# Patient Record
Sex: FEMALE | Race: BLACK OR AFRICAN AMERICAN | Employment: FULL TIME
[De-identification: names, ages, dates, MRNs, and addresses within clinical notes are randomized per-mention and may not be internally consistent; named-entity substitution may affect disease eponyms.]

---

## 2024-08-10 ENCOUNTER — APPOINTMENT (OUTPATIENT)
Facility: HOSPITAL | Age: 29
End: 2024-08-10
Payer: COMMERCIAL

## 2024-08-10 ENCOUNTER — HOSPITAL ENCOUNTER (EMERGENCY)
Facility: HOSPITAL | Age: 29
Discharge: HOME | End: 2024-08-10
Attending: EMERGENCY MEDICINE
Payer: COMMERCIAL

## 2024-08-10 VITALS
WEIGHT: 165.8 LBS | HEIGHT: 63 IN | DIASTOLIC BLOOD PRESSURE: 74 MMHG | TEMPERATURE: 97.9 F | SYSTOLIC BLOOD PRESSURE: 109 MMHG | OXYGEN SATURATION: 99 % | HEART RATE: 61 BPM | BODY MASS INDEX: 29.38 KG/M2 | RESPIRATION RATE: 19 BRPM

## 2024-08-10 DIAGNOSIS — R07.89 CHEST WALL PAIN: Primary | ICD-10-CM

## 2024-08-10 LAB
ANION GAP SERPL CALC-SCNC: 10 MMOL/L (ref 3–18)
BASOPHILS # BLD: 0.1 K/UL (ref 0–0.1)
BASOPHILS NFR BLD: 1 % (ref 0–2)
BUN SERPL-MCNC: 12 MG/DL (ref 7–18)
BUN/CREAT SERPL: 13 (ref 12–20)
CALCIUM SERPL-MCNC: 9 MG/DL (ref 8.5–10.1)
CHLORIDE SERPL-SCNC: 108 MMOL/L (ref 100–111)
CO2 SERPL-SCNC: 23 MMOL/L (ref 21–32)
CREAT SERPL-MCNC: 0.92 MG/DL (ref 0.6–1.3)
DIFFERENTIAL METHOD BLD: ABNORMAL
EKG ATRIAL RATE: 71 BPM
EKG DIAGNOSIS: NORMAL
EKG P AXIS: 33 DEGREES
EKG P-R INTERVAL: 140 MS
EKG Q-T INTERVAL: 416 MS
EKG QRS DURATION: 82 MS
EKG QTC CALCULATION (BAZETT): 452 MS
EKG R AXIS: 15 DEGREES
EKG T AXIS: 14 DEGREES
EKG VENTRICULAR RATE: 71 BPM
EOSINOPHIL # BLD: 0.1 K/UL (ref 0–0.4)
EOSINOPHIL NFR BLD: 1 % (ref 0–5)
ERYTHROCYTE [DISTWIDTH] IN BLOOD BY AUTOMATED COUNT: 13.2 % (ref 11.6–14.5)
GLUCOSE SERPL-MCNC: 104 MG/DL (ref 74–99)
HCT VFR BLD AUTO: 39 % (ref 35–45)
HGB BLD-MCNC: 13.2 G/DL (ref 12–16)
IMM GRANULOCYTES # BLD AUTO: 0 K/UL (ref 0–0.04)
IMM GRANULOCYTES NFR BLD AUTO: 1 % (ref 0–0.5)
LYMPHOCYTES # BLD: 1.8 K/UL (ref 0.9–3.6)
LYMPHOCYTES NFR BLD: 23 % (ref 21–52)
MCH RBC QN AUTO: 30.1 PG (ref 24–34)
MCHC RBC AUTO-ENTMCNC: 33.8 G/DL (ref 31–37)
MCV RBC AUTO: 88.8 FL (ref 78–100)
MONOCYTES # BLD: 0.5 K/UL (ref 0.05–1.2)
MONOCYTES NFR BLD: 7 % (ref 3–10)
NEUTS SEG # BLD: 5.1 K/UL (ref 1.8–8)
NEUTS SEG NFR BLD: 67 % (ref 40–73)
NRBC # BLD: 0 K/UL (ref 0–0.01)
NRBC BLD-RTO: 0 PER 100 WBC
PLATELET # BLD AUTO: 351 K/UL (ref 135–420)
PMV BLD AUTO: 9.2 FL (ref 9.2–11.8)
POTASSIUM SERPL-SCNC: 3.8 MMOL/L (ref 3.5–5.5)
RBC # BLD AUTO: 4.39 M/UL (ref 4.2–5.3)
SODIUM SERPL-SCNC: 141 MMOL/L (ref 136–145)
TROPONIN I SERPL HS-MCNC: <3 NG/L (ref 0–54)
WBC # BLD AUTO: 7.5 K/UL (ref 4.6–13.2)

## 2024-08-10 PROCEDURE — 71045 X-RAY EXAM CHEST 1 VIEW: CPT

## 2024-08-10 PROCEDURE — 93010 ELECTROCARDIOGRAM REPORT: CPT | Performed by: INTERNAL MEDICINE

## 2024-08-10 PROCEDURE — 80048 BASIC METABOLIC PNL TOTAL CA: CPT

## 2024-08-10 PROCEDURE — 85025 COMPLETE CBC W/AUTO DIFF WBC: CPT

## 2024-08-10 PROCEDURE — 93005 ELECTROCARDIOGRAM TRACING: CPT | Performed by: EMERGENCY MEDICINE

## 2024-08-10 PROCEDURE — 96374 THER/PROPH/DIAG INJ IV PUSH: CPT

## 2024-08-10 PROCEDURE — 99285 EMERGENCY DEPT VISIT HI MDM: CPT

## 2024-08-10 PROCEDURE — 6360000002 HC RX W HCPCS: Performed by: EMERGENCY MEDICINE

## 2024-08-10 PROCEDURE — 84484 ASSAY OF TROPONIN QUANT: CPT

## 2024-08-10 RX ORDER — IBUPROFEN 600 MG/1
600 TABLET ORAL 3 TIMES DAILY PRN
Qty: 30 TABLET | Refills: 0 | Status: SHIPPED | OUTPATIENT
Start: 2024-08-10 | End: 2024-08-10

## 2024-08-10 RX ORDER — KETOROLAC TROMETHAMINE 15 MG/ML
15 INJECTION, SOLUTION INTRAMUSCULAR; INTRAVENOUS EVERY 6 HOURS PRN
Status: DISCONTINUED | OUTPATIENT
Start: 2024-08-10 | End: 2024-08-10 | Stop reason: HOSPADM

## 2024-08-10 RX ORDER — IBUPROFEN 600 MG/1
600 TABLET ORAL 3 TIMES DAILY PRN
Qty: 30 TABLET | Refills: 0 | Status: SHIPPED | OUTPATIENT
Start: 2024-08-10

## 2024-08-10 RX ADMIN — KETOROLAC TROMETHAMINE 15 MG: 15 INJECTION, SOLUTION INTRAMUSCULAR; INTRAVENOUS at 06:04

## 2024-08-10 ASSESSMENT — LIFESTYLE VARIABLES
HOW OFTEN DO YOU HAVE A DRINK CONTAINING ALCOHOL: NEVER
HOW MANY STANDARD DRINKS CONTAINING ALCOHOL DO YOU HAVE ON A TYPICAL DAY: PATIENT DOES NOT DRINK

## 2024-08-10 ASSESSMENT — PAIN DESCRIPTION - ORIENTATION
ORIENTATION: MID
ORIENTATION: MID

## 2024-08-10 ASSESSMENT — ENCOUNTER SYMPTOMS
SHORTNESS OF BREATH: 0
BACK PAIN: 0
WHEEZING: 0

## 2024-08-10 ASSESSMENT — PAIN SCALES - GENERAL
PAINLEVEL_OUTOF10: 7
PAINLEVEL_OUTOF10: 2
PAINLEVEL_OUTOF10: 8

## 2024-08-10 ASSESSMENT — PAIN DESCRIPTION - LOCATION
LOCATION: CHEST

## 2024-08-10 ASSESSMENT — PAIN - FUNCTIONAL ASSESSMENT: PAIN_FUNCTIONAL_ASSESSMENT: 0-10

## 2024-08-10 NOTE — ED PROVIDER NOTES
[facility] EMERGENCY DEPT  eMERGENCY dEPARTMENT eNCOUnter      Pt Name: Meche Cullen  MRN: [id number]  Birthdate 1995 of evaluation: 8/10/2024  Provider:Cristóbal Shepherd MD    CHIEF COMPLAINT       HPI    Meche Cullen is a 28 y.o. female who  awakened from sleep with anterior pleuritic chest pain. Her chest pain is worse with deep breathing. No hx of heart or lung diesease.    ROS  Review of Systems   Constitutional: Negative.    HENT: Negative.     Respiratory:  Negative for shortness of breath and wheezing.    Endocrine: Negative for polydipsia and polyuria.   Musculoskeletal:  Negative for back pain.   All other systems reviewed and are negative.      Except as noted above the remainder of the review of systems was reviewed and negative.       PAST MEDICAL HISTORY   History reviewed. No pertinent past medical history.      SURGICAL HISTORY       Past Surgical History:   Procedure Laterality Date     SECTION  2017         CURRENTMEDICATIONS       Previous Medications    DICYCLOMINE (BENTYL) 10 MG CAPSULE    Take 10 mg by mouth 3 times daily    ERGOCALCIFEROL (ERGOCALCIFEROL) 1.25 MG (99985 UT) CAPSULE    take 1 capsule by mouth every week    IBUPROFEN (ADVIL;MOTRIN) 600 MG TABLET    Take 600 mg by mouth every 6 hours as needed    ONDANSETRON (ZOFRAN-ODT) 4 MG DISINTEGRATING TABLET    Place 4 mg under the tongue every 8 hours as needed    PHENTERMINE (ADIPEX-P) 37.5 MG TABLET    Take 37.5 mg by mouth daily.       ALLERGIES     Patient has no known allergies.    FAMILY HISTORY       Family History   Problem Relation Age of Onset    Diabetes Maternal Grandmother           SOCIAL HISTORY       Social History     Socioeconomic History    Marital status: Legally      Spouse name: None    Number of children: None    Years of education: None    Highest education level: None   Tobacco Use    Smoking status: Never     Passive exposure: Never    Smokeless tobacco: Never   Substance and Sexual Activity    Weight: 75.2 kg (165 lb 12.8 oz)     Height: 1.6 m (5' 3\")         MDM    Differential: Muscle skeletal chest wall pain, pleuritis, pneumonia, cardiac problem    XR CHEST PORTABLE    (Results Pending)     CxR: negative    6:02 AM  Upon re-evaluation the patient's symptoms have improved. Pt has non-toxic appearance and condition is stable for discharge. Patient was informed of tests & results, instructed to f/u with PCP and return to the ED upon worsening of symptoms. All questions and concerns were addressed.     EKG: NSR, rate < 100, no ectopy    Procedures    FINAL IMPRESSION      Acute chest wall pain    DISPOSITION/PLAN       DISPOSITION    D/C home    DISCHARGE MEDICATIONS:   Motrin      PATIENT REFERRED TO:  PCP in 5 days.  Return to ER prn.    (Please note that portions of this note were completed with a voice recognitionprogram.  Efforts were made to edit the dictations but occasionally words are mis-transcribed.)    Cristóbal Shepherd MD(electronically signed)  Attending Emergency Physician          Cristóbal Shepherd MD  08/12/24 [id number]

## 2024-08-10 NOTE — ED NOTES
Pt ambulatory to room with steady gait, NAD and A&O x 4. Pt c/o midsternal pain and heaviness x 1 hour. States it woke her up and that the pain worsens when she takes a deep breath. MD aware of all. Pt is resting comfortably on stretcher on cardia monitors and call bell within reach.

## 2024-08-10 NOTE — ED TRIAGE NOTES
A&O female awoke from sleep with SOB and chest heaviness. Denies recent illness, denies cough, congestion n/n/d.

## 2024-08-16 ENCOUNTER — OFFICE VISIT (OUTPATIENT)
Facility: CLINIC | Age: 29
End: 2024-08-16
Payer: COMMERCIAL

## 2024-08-16 VITALS
BODY MASS INDEX: 28.31 KG/M2 | DIASTOLIC BLOOD PRESSURE: 79 MMHG | HEIGHT: 63 IN | TEMPERATURE: 97.6 F | HEART RATE: 97 BPM | OXYGEN SATURATION: 98 % | RESPIRATION RATE: 16 BRPM | WEIGHT: 159.8 LBS | SYSTOLIC BLOOD PRESSURE: 114 MMHG

## 2024-08-16 DIAGNOSIS — R07.89 COSTOCHONDRAL CHEST PAIN: ICD-10-CM

## 2024-08-16 DIAGNOSIS — Z09 HOSPITAL DISCHARGE FOLLOW-UP: Primary | ICD-10-CM

## 2024-08-16 PROCEDURE — 99213 OFFICE O/P EST LOW 20 MIN: CPT | Performed by: NURSE PRACTITIONER

## 2024-08-16 RX ORDER — TOPIRAMATE 50 MG/1
50 TABLET, FILM COATED ORAL 2 TIMES DAILY
COMMUNITY
Start: 2024-07-27

## 2024-08-16 SDOH — ECONOMIC STABILITY: FOOD INSECURITY: WITHIN THE PAST 12 MONTHS, THE FOOD YOU BOUGHT JUST DIDN'T LAST AND YOU DIDN'T HAVE MONEY TO GET MORE.: NEVER TRUE

## 2024-08-16 SDOH — ECONOMIC STABILITY: FOOD INSECURITY: WITHIN THE PAST 12 MONTHS, YOU WORRIED THAT YOUR FOOD WOULD RUN OUT BEFORE YOU GOT MONEY TO BUY MORE.: NEVER TRUE

## 2024-08-16 SDOH — ECONOMIC STABILITY: INCOME INSECURITY: HOW HARD IS IT FOR YOU TO PAY FOR THE VERY BASICS LIKE FOOD, HOUSING, MEDICAL CARE, AND HEATING?: NOT HARD AT ALL

## 2024-08-16 ASSESSMENT — ANXIETY QUESTIONNAIRES
7. FEELING AFRAID AS IF SOMETHING AWFUL MIGHT HAPPEN: NOT AT ALL
GAD7 TOTAL SCORE: 0
3. WORRYING TOO MUCH ABOUT DIFFERENT THINGS: NOT AT ALL
6. BECOMING EASILY ANNOYED OR IRRITABLE: NOT AT ALL
IF YOU CHECKED OFF ANY PROBLEMS ON THIS QUESTIONNAIRE, HOW DIFFICULT HAVE THESE PROBLEMS MADE IT FOR YOU TO DO YOUR WORK, TAKE CARE OF THINGS AT HOME, OR GET ALONG WITH OTHER PEOPLE: NOT DIFFICULT AT ALL
1. FEELING NERVOUS, ANXIOUS, OR ON EDGE: NOT AT ALL
2. NOT BEING ABLE TO STOP OR CONTROL WORRYING: NOT AT ALL
4. TROUBLE RELAXING: NOT AT ALL
5. BEING SO RESTLESS THAT IT IS HARD TO SIT STILL: NOT AT ALL

## 2024-08-16 ASSESSMENT — PATIENT HEALTH QUESTIONNAIRE - PHQ9
2. FEELING DOWN, DEPRESSED OR HOPELESS: NOT AT ALL
SUM OF ALL RESPONSES TO PHQ9 QUESTIONS 1 & 2: 0
SUM OF ALL RESPONSES TO PHQ QUESTIONS 1-9: 0
1. LITTLE INTEREST OR PLEASURE IN DOING THINGS: NOT AT ALL

## 2024-08-16 NOTE — PROGRESS NOTES
Meche Cullen (:  1995) is a 28 y.o. female, here for evaluation of the following medical concerns:  Chief Complaint   Patient presents with    ED Follow-up     8/10/2024- [facility] ER: Follow-up chest pain, unresolved now with sensitive to the touch          ASSESSMENT/PLAN:    1. Hospital discharge follow-up  2. Costochondral chest pain     Return if symptoms worsen or fail to improve.        HPI  She presented on 8/10/2024 to [facility] ER for sob and chest heaviness that woke her from sleep. DX with acute wall chest pain- MSK, ibuprofen  Xray Result (most recent):  XR CHEST PORTABLE 08/10/2024    Narrative  EXAM: XR CHEST PORTABLE    CLINICAL INDICATION/HISTORY: chest pain, SOB    TECHNIQUE: Portable AP view of the chest    COMPARISON: None    FINDINGS:      Faint opacities projecting of the right upper lung may be external to the  patient or could represent airspace disease. No dense consolidation. No large  pleural effusion. No pneumothorax. Normal cardiomediastinal silhouette.    Impression  Faint opacities projecting of the right upper lung may be external to the  patient or could represent airspace disease.      Electronically signed by Blair Lynch    Lab Results   Component Value Date    WBC 7.5 08/10/2024    HGB 13.2 08/10/2024    HCT 39.0 08/10/2024    MCV 88.8 08/10/2024     08/10/2024     Lab Results   Component Value Date     08/10/2024    K 3.8 08/10/2024     08/10/2024    CO2 23 08/10/2024    BUN 12 08/10/2024    CREATININE 0.92 08/10/2024    GLUCOSE 104 (H) 08/10/2024    CALCIUM 9.0 08/10/2024    BILITOT 0.8 2022    ALKPHOS 95 2022    AST 20 2022    ALT 27 2022    LABGLOM 86 08/10/2024    GFRAA >60 2022    AGRATIO 0.9 2022    GLOB 4.1 (H) 2022     EKG reported NSR    Continues to have a loose bra states the compression hurts the chest. Ibuprofen has helped with that pain.   Has been working out a lot weights and push ups so possible could

## 2024-08-16 NOTE — PROGRESS NOTES
\"Have you been to the ER, urgent care clinic since your last visit?  Hospitalized since your last visit?\"    8/10/2024- [facility] ER: Follow-up chest pain    “Have you seen or consulted any other health care providers outside of [facility] since your last visit?”    NO     “Have you had a pap smear?”    NO    Date of last Cervical Cancer screen (HPV or PAP): 1/18/2021     Click Here for Release of Records Request

## 2025-03-10 ENCOUNTER — APPOINTMENT (OUTPATIENT)
Facility: HOSPITAL | Age: 30
End: 2025-03-10
Payer: COMMERCIAL

## 2025-03-10 ENCOUNTER — HOSPITAL ENCOUNTER (EMERGENCY)
Facility: HOSPITAL | Age: 30
Discharge: HOME | End: 2025-03-10
Attending: EMERGENCY MEDICINE
Payer: COMMERCIAL

## 2025-03-10 VITALS
OXYGEN SATURATION: 100 % | HEART RATE: 85 BPM | SYSTOLIC BLOOD PRESSURE: 119 MMHG | HEIGHT: 63 IN | RESPIRATION RATE: 18 BRPM | DIASTOLIC BLOOD PRESSURE: 65 MMHG | BODY MASS INDEX: 31.01 KG/M2 | TEMPERATURE: 98 F | WEIGHT: 175 LBS

## 2025-03-10 DIAGNOSIS — K43.9 ABDOMINAL WALL HERNIA: Primary | ICD-10-CM

## 2025-03-10 LAB
ALBUMIN SERPL-MCNC: 3 G/DL (ref 3.4–5)
ALBUMIN/GLOB SERPL: 0.8 (ref 0.8–1.7)
ALP SERPL-CCNC: 69 U/L (ref 45–117)
ALT SERPL-CCNC: 32 U/L (ref 13–56)
ANION GAP SERPL CALC-SCNC: 6 MMOL/L (ref 3–18)
APPEARANCE UR: NORMAL
AST SERPL-CCNC: 16 U/L (ref 10–38)
BASOPHILS # BLD: 0.05 K/UL (ref 0–0.1)
BASOPHILS NFR BLD: 0.4 % (ref 0–2)
BILIRUB SERPL-MCNC: 0.4 MG/DL (ref 0.2–1)
BILIRUB UR QL: NEGATIVE
BUN SERPL-MCNC: 6 MG/DL (ref 7–18)
BUN/CREAT SERPL: 8 (ref 12–20)
CALCIUM SERPL-MCNC: 9.2 MG/DL (ref 8.5–10.1)
CHLORIDE SERPL-SCNC: 105 MMOL/L (ref 100–111)
CO2 SERPL-SCNC: 26 MMOL/L (ref 21–32)
COLOR UR: YELLOW
CREAT SERPL-MCNC: 0.73 MG/DL (ref 0.6–1.3)
DIFFERENTIAL METHOD BLD: ABNORMAL
EOSINOPHIL # BLD: 0.16 K/UL (ref 0–0.4)
EOSINOPHIL NFR BLD: 1.3 % (ref 0–5)
ERYTHROCYTE [DISTWIDTH] IN BLOOD BY AUTOMATED COUNT: 13.5 % (ref 11.6–14.5)
GLOBULIN SER CALC-MCNC: 3.7 G/DL (ref 2–4)
GLUCOSE SERPL-MCNC: 90 MG/DL (ref 74–99)
GLUCOSE UR STRIP.AUTO-MCNC: NEGATIVE MG/DL
HCG SERPL-ACNC: ABNORMAL MIU/ML (ref 0–10)
HCT VFR BLD AUTO: 34.5 % (ref 35–45)
HGB BLD-MCNC: 11.6 G/DL (ref 12–16)
HGB UR QL STRIP: NEGATIVE
IMM GRANULOCYTES # BLD AUTO: 0.17 K/UL (ref 0–0.04)
IMM GRANULOCYTES NFR BLD AUTO: 1.4 % (ref 0–0.5)
KETONES UR QL STRIP.AUTO: NEGATIVE MG/DL
LEUKOCYTE ESTERASE UR QL STRIP.AUTO: NEGATIVE
LYMPHOCYTES # BLD: 2.11 K/UL (ref 0.9–3.6)
LYMPHOCYTES NFR BLD: 17.5 % (ref 21–52)
MCH RBC QN AUTO: 30.2 PG (ref 24–34)
MCHC RBC AUTO-ENTMCNC: 33.6 G/DL (ref 31–37)
MCV RBC AUTO: 89.8 FL (ref 78–100)
MONOCYTES # BLD: 0.91 K/UL (ref 0.05–1.2)
MONOCYTES NFR BLD: 7.5 % (ref 3–10)
NEUTS SEG # BLD: 8.68 K/UL (ref 1.8–8)
NEUTS SEG NFR BLD: 71.9 % (ref 40–73)
NITRITE UR QL STRIP.AUTO: NEGATIVE
NRBC # BLD: 0 K/UL (ref 0–0.01)
NRBC BLD-RTO: 0 PER 100 WBC
PH UR STRIP: 6.5 (ref 5–8)
PLATELET # BLD AUTO: 353 K/UL (ref 135–420)
PMV BLD AUTO: 9.6 FL (ref 9.2–11.8)
POTASSIUM SERPL-SCNC: 3.7 MMOL/L (ref 3.5–5.5)
PROT SERPL-MCNC: 6.7 G/DL (ref 6.4–8.2)
PROT UR STRIP-MCNC: NEGATIVE MG/DL
RBC # BLD AUTO: 3.84 M/UL (ref 4.2–5.3)
SODIUM SERPL-SCNC: 137 MMOL/L (ref 136–145)
SP GR UR REFRACTOMETRY: 1.01 (ref 1–1.03)
UROBILINOGEN UR QL STRIP.AUTO: 1 EU/DL (ref 0.2–1)
WBC # BLD AUTO: 12.1 K/UL (ref 4.6–13.2)

## 2025-03-10 PROCEDURE — 84702 CHORIONIC GONADOTROPIN TEST: CPT

## 2025-03-10 PROCEDURE — 85025 COMPLETE CBC W/AUTO DIFF WBC: CPT

## 2025-03-10 PROCEDURE — 76705 ECHO EXAM OF ABDOMEN: CPT

## 2025-03-10 PROCEDURE — 6370000000 HC RX 637 (ALT 250 FOR IP): Performed by: EMERGENCY MEDICINE

## 2025-03-10 PROCEDURE — 80053 COMPREHEN METABOLIC PANEL: CPT

## 2025-03-10 PROCEDURE — 81003 URINALYSIS AUTO W/O SCOPE: CPT

## 2025-03-10 PROCEDURE — 99284 EMERGENCY DEPT VISIT MOD MDM: CPT

## 2025-03-10 RX ORDER — LIDOCAINE 4 G/G
1 PATCH TOPICAL
Status: DISCONTINUED | OUTPATIENT
Start: 2025-03-10 | End: 2025-03-11 | Stop reason: HOSPADM

## 2025-03-10 ASSESSMENT — PAIN DESCRIPTION - LOCATION: LOCATION: ABDOMEN

## 2025-03-10 ASSESSMENT — PAIN - FUNCTIONAL ASSESSMENT: PAIN_FUNCTIONAL_ASSESSMENT: 0-10

## 2025-03-10 ASSESSMENT — PAIN DESCRIPTION - FREQUENCY: FREQUENCY: CONTINUOUS

## 2025-03-10 ASSESSMENT — LIFESTYLE VARIABLES
HOW MANY STANDARD DRINKS CONTAINING ALCOHOL DO YOU HAVE ON A TYPICAL DAY: PATIENT DOES NOT DRINK
HOW OFTEN DO YOU HAVE A DRINK CONTAINING ALCOHOL: NEVER

## 2025-03-10 ASSESSMENT — PAIN DESCRIPTION - PAIN TYPE: TYPE: ACUTE PAIN

## 2025-03-10 ASSESSMENT — PAIN SCALES - GENERAL
PAINLEVEL_OUTOF10: 0
PAINLEVEL_OUTOF10: 7

## 2025-03-10 ASSESSMENT — PAIN DESCRIPTION - ONSET: ONSET: GRADUAL

## 2025-03-10 ASSESSMENT — PAIN DESCRIPTION - DESCRIPTORS: DESCRIPTORS: THROBBING

## 2025-03-10 NOTE — ED NOTES
Assumed care of pt resting on stretcher BP and SPO2 in place respirations even and unlabored , call bell in reach NAD noted, VSS at this time. Pt reports abdominal pain onset 1400 today pt is 18wks gestation due to deliver at [facility] pt states spoke with OB and instructed to go to ED pt reports pain 7/10, denies vaginal discharge and bleeding at this time. Pt states worse with walking

## 2025-03-10 NOTE — ED PROVIDER NOTES
abdominal wall hernia in setting of second trimester.  Bedside ultrasound showed fetal movement,  appearance of fetal heart rate.  Will need general surgery consultation follow-up for consultation likely for repair after she delivers her child.  No clinical signs of incarceration or strangulation at this time but discussed reasons to return.    Documentation/Prior Results Review:  Nursing notes    Social Determinants of Health: None identified    The patient will be discharged home. The patient was reassured that these symptoms do not appear to represent a serious or life threatening condition at this time. Warning signs of worsening condition were discusses and understood by the patient. Based on patient's age, coexisting illness, exam and the results of this ED evaluation, the decision to treat as an outpatient was made. Based on the information available at time of discharge, acute pathology requiring immediate intervention was deemed relative unlikely. While it is impossible to completely exclude the possibility of underlying serious disease or worsening condition, I feel the relative likelihood is extremely low. I discussed this uncertainty with the patient and/or family member/caregiver, who understood ED evaluation and treatment and felt comfortable with the outpatient treatment plan. All questions regarding care, test results and follow up were answered. At discharge, pt looked well, nontoxic, no distress, and is good candidate for outpatient follow up. They understand that they should return to the Emergency Department for any new or worsening symptoms. I stressed the importance of follow up for repeat assessment and possibly further evaluation/treatment.        Meds Given in ED:  Medications   lidocaine 4 % external patch 1 patch (1 patch TransDERmal Patch Applied 3/10/25 2008)       Final Diagnosis:  1. Abdominal wall hernia        Disposition:  Destination: Discharge    Discharge Rx:   New Prescriptions

## 2025-03-10 NOTE — ED TRIAGE NOTES
Patient arrived to ER with complaints of knot in her stomach, approximately 17 weeks pregnant. G2, P1, ab0. Pain started last week and subsided, then today got another pain and decided to come and be checked. Denies any vaginal bleeding.

## 2025-03-11 NOTE — DISCHARGE INSTRUCTIONS
Can apply warm compress, take Tylenol 1 g up to 3 times per day as needed for pain only.  Would avoid lifting heavy objects greater than 25 pounds to prevent worsening of the pain, increase size of the hernia opening.

## 2025-04-03 NOTE — PROGRESS NOTES
Meche Cullen is a 29 y.o. female (: 1995)     Chief Complaint   Patient presents with    New Patient     Abdominal wall hernia        Medication list and allergies have been reviewed with Meche Cullen and updated as of today's date.     I have gone over all Medical, Surgical and Social History with Meche Alyse and updated/added the information accordingly.

## 2025-04-04 ENCOUNTER — OFFICE VISIT (OUTPATIENT)
Age: 30
End: 2025-04-04
Payer: COMMERCIAL

## 2025-04-04 VITALS
SYSTOLIC BLOOD PRESSURE: 126 MMHG | RESPIRATION RATE: 16 BRPM | BODY MASS INDEX: 32.14 KG/M2 | HEIGHT: 63 IN | OXYGEN SATURATION: 98 % | TEMPERATURE: 96.9 F | DIASTOLIC BLOOD PRESSURE: 70 MMHG | HEART RATE: 100 BPM | WEIGHT: 181.4 LBS

## 2025-04-04 DIAGNOSIS — K42.9 UMBILICAL HERNIA WITHOUT OBSTRUCTION AND WITHOUT GANGRENE: Primary | ICD-10-CM

## 2025-04-04 PROCEDURE — 99204 OFFICE O/P NEW MOD 45 MIN: CPT

## 2025-04-04 ASSESSMENT — ENCOUNTER SYMPTOMS
NAUSEA: 0
COLOR CHANGE: 0
ABDOMINAL PAIN: 0
SHORTNESS OF BREATH: 0
VOMITING: 0
CONSTIPATION: 1

## 2025-04-04 NOTE — PROGRESS NOTES
CC:   Chief Complaint   Patient presents with    New Patient     Abdominal wall hernia         Assessment:    ICD-10-CM    1. Umbilical hernia without obstruction and without gangrene  K42.9           Plan: I have personally reviewed abdominal ultrasound from 3/10/2025 demonstrating a 1.1 cm defect visible with Valsalva just superior to the umbilicus. Due to her pregnancy, recommend conservative management with close follow-up at this time.  We discussed this at length during the visit.  And she is agreeable to the plan.  She may use Tylenol as needed for discomfort.  Recommend hydration, high-fiber diet, and over-the-counter stool softeners as needed for constipation. She may use over-the-counter pregnancy support belt or high paneled pregnancy support pants as needed for discomfort. I would like her to follow up with me or Dr. Mandel in 2 months or sooner with questions or concerns.    HPI:  Meche Cullen is a 29 y.o. female who is referred by [facility] ED for umbilical hernia.  She is currently pregnant and is due in August with her second child. She was seen by the ER on 3/10/2025 for intermittent upper abdominal pain.  She states that she first started experiencing this pain in her umbilical area in December/January area.  She states that her pain has increased over the past 2 months.  It is worse with standing and movement and improves when lying flat. She has been experiencing constipation for the past few months. Last bowel movement was yesterday. She denies fevers or chills.     Allergies:  No Known Allergies    Medication Review:  No current outpatient medications on file prior to visit.     No current facility-administered medications on file prior to visit.       Systems Review:  Review of Systems   Constitutional:  Negative for chills and fever.   Respiratory:  Negative for shortness of breath.    Cardiovascular:  Negative for chest pain.   Gastrointestinal:  Positive for constipation. Negative for

## 2025-05-13 ENCOUNTER — OFFICE VISIT (OUTPATIENT)
Facility: CLINIC | Age: 30
End: 2025-05-13
Payer: COMMERCIAL

## 2025-05-13 VITALS
BODY MASS INDEX: 33.52 KG/M2 | OXYGEN SATURATION: 97 % | SYSTOLIC BLOOD PRESSURE: 131 MMHG | TEMPERATURE: 98.1 F | DIASTOLIC BLOOD PRESSURE: 82 MMHG | WEIGHT: 189.2 LBS | HEIGHT: 63 IN | HEART RATE: 100 BPM

## 2025-05-13 DIAGNOSIS — H10.32 ACUTE BACTERIAL CONJUNCTIVITIS OF LEFT EYE: Primary | ICD-10-CM

## 2025-05-13 PROCEDURE — 99213 OFFICE O/P EST LOW 20 MIN: CPT | Performed by: NURSE PRACTITIONER

## 2025-05-13 RX ORDER — ERYTHROMYCIN 5 MG/G
OINTMENT OPHTHALMIC
Qty: 3.5 G | Refills: 0 | Status: CANCELLED | OUTPATIENT
Start: 2025-05-13

## 2025-05-13 RX ORDER — POLYMYXIN B SULFATE AND TRIMETHOPRIM 1; 10000 MG/ML; [USP'U]/ML
1 SOLUTION OPHTHALMIC EVERY 4 HOURS
Qty: 1 EACH | Refills: 0 | Status: SHIPPED | OUTPATIENT
Start: 2025-05-13 | End: 2025-05-20

## 2025-05-13 SDOH — ECONOMIC STABILITY: FOOD INSECURITY: WITHIN THE PAST 12 MONTHS, THE FOOD YOU BOUGHT JUST DIDN'T LAST AND YOU DIDN'T HAVE MONEY TO GET MORE.: NEVER TRUE

## 2025-05-13 SDOH — ECONOMIC STABILITY: FOOD INSECURITY: WITHIN THE PAST 12 MONTHS, YOU WORRIED THAT YOUR FOOD WOULD RUN OUT BEFORE YOU GOT MONEY TO BUY MORE.: NEVER TRUE

## 2025-05-13 ASSESSMENT — PATIENT HEALTH QUESTIONNAIRE - PHQ9
2. FEELING DOWN, DEPRESSED OR HOPELESS: NOT AT ALL
SUM OF ALL RESPONSES TO PHQ QUESTIONS 1-9: 0
SUM OF ALL RESPONSES TO PHQ QUESTIONS 1-9: 0
1. LITTLE INTEREST OR PLEASURE IN DOING THINGS: NOT AT ALL
SUM OF ALL RESPONSES TO PHQ QUESTIONS 1-9: 0
SUM OF ALL RESPONSES TO PHQ QUESTIONS 1-9: 0

## 2025-05-13 ASSESSMENT — ENCOUNTER SYMPTOMS
EYE REDNESS: 1
EYE DISCHARGE: 1

## 2025-05-13 NOTE — PROGRESS NOTES
Meche Cullen (:  1995) is a 29 y.o. female, here for evaluation of the following medical concerns:  Chief Complaint   Patient presents with    Stye     Constant pain, hurts to sleep at night, has been pusing.         ASSESSMENT/PLAN:    Assessment & Plan    Acute bacterial conjunctivitis of left eye    Results             History of Present Illness    1. Bacterial conjunctivitis.  - The condition has persisted for 2 weeks, accompanied by drainage.  - No systemic symptoms such as fevers or chills, which is reassuring.  - Presence of crusting and drainage upon waking, coupled with the lack of improvement, necessitates treatment.  - A prescription for polymyxin eyedrops will be sent to the pharmacy. She is advised to administer the drops every 4 hours and continue with warm compresses for 15 to 20 minutes. If the condition worsens or new symptoms arise, she should inform her doctor immediately.      History of Present Illness  The patient presents for evaluation of a stye in her left eye.    She has been experiencing this condition for approximately 2 weeks, characterized by a sensation of pressure in the affected eye. This discomfort is particularly pronounced during sleep, to the extent that she struggles to close her eye without the application of heat. The severity of the condition has remained relatively consistent, but she has recently noticed an oozing discharge from the eye, which results in crusting upon waking. She reports no systemic symptoms such as fevers or chills. She has been managing the condition with heat application a few times daily.    Review of Systems   Eyes:  Positive for discharge and redness.       Prior to Visit Medications    Medication Sig Taking? Authorizing Provider   trimethoprim-polymyxin b (POLYTRIM) 52066-2.1 UNIT/ML-% ophthalmic solution Place 1 drop into the left eye every 4 hours for 7 days Yes Dulce Piper, APRN - NP        Social History     Tobacco Use

## 2025-05-13 NOTE — PROGRESS NOTES
Have you been to the ER, urgent care clinic since your last visit?  Hospitalized since your last visit?   3/10/2025    Have you seen or consulted any other health care providers outside our system since your last visit?   4/4/2025 - [facility]